# Patient Record
Sex: FEMALE | Race: WHITE | NOT HISPANIC OR LATINO | URBAN - NONMETROPOLITAN AREA
[De-identification: names, ages, dates, MRNs, and addresses within clinical notes are randomized per-mention and may not be internally consistent; named-entity substitution may affect disease eponyms.]

---

## 2019-04-08 ENCOUNTER — OFFICE VISIT CONVERTED (OUTPATIENT)
Dept: FAMILY MEDICINE CLINIC | Age: 49
End: 2019-04-08
Attending: NURSE PRACTITIONER

## 2019-04-08 ENCOUNTER — HOSPITAL ENCOUNTER (OUTPATIENT)
Dept: OTHER | Facility: HOSPITAL | Age: 49
Discharge: HOME | End: 2019-04-08
Attending: NURSE PRACTITIONER

## 2019-04-08 LAB
ALBUMIN SERPL-MCNC: 4 G/DL (ref 3.5–5)
ALBUMIN/GLOB SERPL: 1.7 {RATIO} (ref 1.4–2.6)
ALP SERPL-CCNC: 58 U/L (ref 42–98)
ALT SERPL-CCNC: 10 U/L (ref 10–40)
ANION GAP SERPL CALC-SCNC: 16 MMOL/L (ref 8–19)
AST SERPL-CCNC: 18 U/L (ref 15–50)
BASOPHILS # BLD AUTO: 0.06 10*3/UL (ref 0–0.2)
BASOPHILS NFR BLD AUTO: 0.9 % (ref 0–3)
BILIRUB SERPL-MCNC: <0.15 MG/DL (ref 0.2–1.3)
BUN SERPL-MCNC: 11 MG/DL (ref 5–25)
BUN/CREAT SERPL: 15 {RATIO} (ref 6–20)
CALCIUM SERPL-MCNC: 8.2 MG/DL (ref 8.7–10.4)
CHLORIDE SERPL-SCNC: 107 MMOL/L (ref 99–111)
CONV ABS IMM GRAN: 0.02 10*3/UL (ref 0–0.2)
CONV CO2: 22 MMOL/L (ref 22–32)
CONV IMMATURE GRAN: 0.3 % (ref 0–1.8)
CONV TOTAL PROTEIN: 6.4 G/DL (ref 6.3–8.2)
CREAT UR-MCNC: 0.71 MG/DL (ref 0.5–0.9)
DEPRECATED RDW RBC AUTO: 47.1 FL (ref 36.4–46.3)
EOSINOPHIL # BLD AUTO: 0.16 10*3/UL (ref 0–0.7)
EOSINOPHIL # BLD AUTO: 2.3 % (ref 0–7)
ERYTHROCYTE [DISTWIDTH] IN BLOOD BY AUTOMATED COUNT: 14.8 % (ref 11.7–14.4)
GFR SERPLBLD BASED ON 1.73 SQ M-ARVRAT: >60 ML/MIN/{1.73_M2}
GLOBULIN UR ELPH-MCNC: 2.4 G/DL (ref 2–3.5)
GLUCOSE SERPL-MCNC: 93 MG/DL (ref 65–99)
HBA1C MFR BLD: 6 G/DL (ref 12–16)
HCT VFR BLD AUTO: 20.4 % (ref 37–47)
LYMPHOCYTES # BLD AUTO: 2.61 10*3/UL (ref 1–5)
MCH RBC QN AUTO: 25.4 PG (ref 27–31)
MCHC RBC AUTO-ENTMCNC: 29.4 G/DL (ref 33–37)
MCV RBC AUTO: 86.4 FL (ref 81–99)
MONOCYTES # BLD AUTO: 0.63 10*3/UL (ref 0.2–1.2)
MONOCYTES NFR BLD AUTO: 9 % (ref 3–10)
NEUTROPHILS # BLD AUTO: 3.55 10*3/UL (ref 2–8)
NEUTROPHILS NFR BLD AUTO: 50.4 % (ref 30–85)
NRBC CBCN: 0 % (ref 0–0.7)
OSMOLALITY SERPL CALC.SUM OF ELEC: 291 MOSM/KG (ref 273–304)
PLATELET # BLD AUTO: 395 10*3/UL (ref 130–400)
PMV BLD AUTO: 9.7 FL (ref 9.4–12.3)
POTASSIUM SERPL-SCNC: 4 MMOL/L (ref 3.5–5.3)
RBC # BLD AUTO: 2.36 10*6/UL (ref 4.2–5.4)
SODIUM SERPL-SCNC: 141 MMOL/L (ref 135–147)
TSH SERPL-ACNC: 1.65 M[IU]/L (ref 0.27–4.2)
VARIANT LYMPHS NFR BLD MANUAL: 37.1 % (ref 20–45)
WBC # BLD AUTO: 7.03 10*3/UL (ref 4.8–10.8)

## 2021-05-18 NOTE — PROGRESS NOTES
Christelle Hester 1970     Office/Outpatient Visit    Visit Date:  03:58 pm    Provider: Agnes Sun N.P. (Assistant: Ingrid Beltran MA)    Location: [facility]        Electronically signed by Agnse Sun N.P. on  2019 09:36:11 PM                             SUBJECTIVE:        CC:     Ms. Hester is a 48 year old female.  This is her first visit to the clinic.  menstruating for over 5 weeks; acid reflux; she is O neg, gave blood in mid March /she moved here from [facility] 6 months ago.          HPI:         PHQ-9 Depression Screening: Completed form scanned and in chart; Total Score 8 Alcohol Consumption Screening: Completed form scanned and in chart; Total Score 6         Concerning menorrhagia, current menstrual pattern is described as frequent menses, with heavy flow.  Has had a fibroid. last Pap was 2 1/2 years and normal     ROS:     CONSTITUTIONAL:  Negative for chills, fatigue, fever, and weight change.      CARDIOVASCULAR:  Positive for chest pain and palpitations ( over a week ).  when she exerts herself more     RESPIRATORY:  Positive for dyspnea ( with moderate exertion ).      GASTROINTESTINAL:  Positive for acid reflux symptoms ( over 6 months ).      NEUROLOGICAL:  Negative for dizziness, headaches, paresthesias, and weakness.          PMH/FMH/SH:     Last Reviewed on 2019 04:13 PM by Agnes Sun    Past Medical History:                 PAST MEDICAL HISTORY             GYNECOLOGICAL HISTORY:    ; 1 ectopic pregnancy    Problems with pregnancy have included elevated bp.      Problems with menstrual cycles include irregular frequency/duration.  Hospitalizations: PID, admitted once on          Surgical History:         Tubal Ligation: at age 24;      ectopic pregancy / surgery age  21;         Family History:     Father: Healthy     Mother: Healthy     Brother(s): Healthy; 2 brother(s) total     Sister(s): Healthy; 2 sister(s) total      Daughter(s): Healthy; 1 daughter(s) total         Social History:     Occupation: [employer] ([employer])     Marital Status:      Children: 1 child         Tobacco/Alcohol/Supplements:     Last Reviewed on 4/08/2019 04:06 PM by Ingrid Beltran    Tobacco: She has a past history of cigarette smoking; quit date:  2018.          Alcohol: Frequency: Once a week Just on weekends When she drinks, the average quantity of alcohol is 4-6 beers.   She typically consumes beer.              Immunizations:     None        Allergies:     Last Reviewed on 4/08/2019 04:06 PM by Ingrid Beltran      No Known Drug Allergies.         Current Medications:     Last Reviewed on 4/08/2019 04:06 PM by Ingrid Beltran    None        OBJECTIVE:        Vitals:         Current: 4/8/2019 4:06:08 PM    Ht:  5 ft, 3.5 in;  Wt: 175.6 lbs;  BMI: 30.6    T: 98.7 F (oral);  BP: 137/78 mm Hg (left arm, sitting);  P: 100 bpm (left arm (BP Cuff), sitting)        Exams:     PHYSICAL EXAM:     GENERAL: vital signs recorded - well developed, well nourished;  no apparent distress;     NECK: carotid exam reveals no bruits;     RESPIRATORY: normal respiratory rate and pattern with no distress; normal breath sounds with no rales, rhonchi, wheezes or rubs;     CARDIOVASCULAR: rate is tachycardic;  rhythm is regular;  no systolic murmur; no edema;     PSYCHIATRIC:  appropriate affect and demeanor; normal speech pattern; grossly normal memory;         ASSESSMENT           V79.0   Z13.89  Screening for depression              DDx:     626.2   N92.0  Menorrhagia              DDx:     530.81   K21.9  GERD              DDx:         ORDERS:         Lab Orders:       40000  BDCBC - [facility] CBC with 3 part diff  (Send-Out)         86756  COMP - [facility] Comp. Metabolic Panel  (Send-Out)         77543  TSH - [facility] TSH  (Send-Out)                   PLAN:          Screening for depression         RECOMMENDATIONS given include: reduce alcohol intake.  MIPS  PHQ-9 Depression Screening Completed form scanned and in chart; Total Score 8          Menorrhagia she would be interested in seeing Dr Ramona Mahoney or one of her associates; she will sign of release for her previous medical records     LABORATORY:  Labs ordered to be performed today include CBC, Comprehensive metabolic panel, and TSH.            Orders:       [id number]  BDCBC - [facility] CBC with 3 part diff  (Send-Out)         44415  COMP - [facility] Comp. Metabolic Panel  (Send-Out)         11696  TSH - [facility] TSH  (Send-Out)            GERD she will try OTC prilosec 20 mg and see how she does         FOLLOW-UP: Advised to call if there is no improvement in 2-3 week(s).      RECOMMENDATIONS given include: reduce alcohol consumption.              CHARGE CAPTURE           **Please note: ICD descriptions below are intended for billing purposes only and may not represent clinical diagnoses**        Primary Diagnosis:         V79.0 Screening for depression            Z13.89    Encounter for screening for other disorder              Orders:          09656   Office visit - new pt, level 2  (In-House)           626.2 Menorrhagia            N92.0    Excessive and frequent menstruation with regular cycle    530.81 GERD            K21.9    Gastro-esophageal reflux disease without esophagitis

## 2021-07-01 VITALS
HEIGHT: 64 IN | BODY MASS INDEX: 29.98 KG/M2 | DIASTOLIC BLOOD PRESSURE: 78 MMHG | SYSTOLIC BLOOD PRESSURE: 137 MMHG | WEIGHT: 175.6 LBS | HEART RATE: 100 BPM | TEMPERATURE: 98.7 F